# Patient Record
Sex: MALE | Race: WHITE | Employment: OTHER | URBAN - METROPOLITAN AREA
[De-identification: names, ages, dates, MRNs, and addresses within clinical notes are randomized per-mention and may not be internally consistent; named-entity substitution may affect disease eponyms.]

---

## 2022-05-27 ENCOUNTER — OFFICE VISIT (OUTPATIENT)
Dept: ORTHOPEDIC SURGERY | Age: 67
End: 2022-05-27
Payer: MEDICARE

## 2022-05-27 DIAGNOSIS — M25.511 RIGHT SHOULDER PAIN, UNSPECIFIED CHRONICITY: Primary | ICD-10-CM

## 2022-05-27 DIAGNOSIS — M75.101 RIGHT ROTATOR CUFF TEAR ARTHROPATHY: ICD-10-CM

## 2022-05-27 DIAGNOSIS — M12.811 RIGHT ROTATOR CUFF TEAR ARTHROPATHY: ICD-10-CM

## 2022-05-27 PROCEDURE — 1123F ACP DISCUSS/DSCN MKR DOCD: CPT | Performed by: ORTHOPAEDIC SURGERY

## 2022-05-27 PROCEDURE — 99204 OFFICE O/P NEW MOD 45 MIN: CPT | Performed by: ORTHOPAEDIC SURGERY

## 2022-05-27 RX ORDER — DORZOLAMIDE HYDROCHLORIDE AND TIMOLOL MALEATE 20; 5 MG/ML; MG/ML
SOLUTION/ DROPS OPHTHALMIC
COMMUNITY
Start: 2022-05-04

## 2022-05-27 RX ORDER — LATANOPROST 50 UG/ML
SOLUTION/ DROPS OPHTHALMIC
COMMUNITY
Start: 2022-05-04

## 2022-05-27 RX ORDER — MELOXICAM 7.5 MG/1
7.5 TABLET ORAL DAILY
Qty: 14 TABLET | Refills: 1 | Status: SHIPPED | OUTPATIENT
Start: 2022-05-27 | End: 2022-06-10

## 2022-05-27 RX ORDER — ATORVASTATIN CALCIUM 40 MG/1
TABLET, FILM COATED ORAL
COMMUNITY
Start: 2022-03-04

## 2022-05-27 NOTE — PATIENT INSTRUCTIONS
Shoulder Joint Replacement  Although shoulder joint replacement is less common than knee or hip replacement, it is just as successful in relieving joint pain. Shoulder replacement surgery was first performed in the [facility] in the 1950s to treat severe shoulder fractures. Over the years, shoulder joint replacement has come to be used for many other painful conditions of the shoulder, such as different forms of arthritis. Today, about 53,000 people in the [place] have shoulder replacement surgery each year, according to the [employer]. This compares to more than 900,000 Americans a year who have knee and hip replacement surgery. If nonsurgical treatments like medications and activity changes are no longer helpful for relieving pain, you may want to consider shoulder joint replacement surgery. Joint replacement surgery is a safe and effective procedure to relieve pain and help you resume everyday activities. Whether you have just begun exploring treatment options or have already decided to have shoulder joint replacement surgery, this article will help you understand more about this valuable procedure. Anatomy  Your shoulder is made up of three bones: your upper arm bone (humerus), your shoulder blade (scapula), and your collarbone (clavicle). The shoulder is a ball-and-socket joint: The ball, or head, of your upper arm bone fits into a shallow socket in your shoulder blade. This socket is called the glenoid. The surfaces of the bones where they touch are covered with articular cartilage, a smooth substance that protects the bones and enables them to move easily. A thin, smooth tissue called synovial membrane covers all remaining surfaces inside the shoulder joint. In a healthy shoulder, this membrane makes a small amount of fluid that lubricates the cartilage and eliminates almost any friction in your shoulder.     The muscles and tendons that surround the shoulder provide stability and support. All of these structures allow the shoulder to rotate through a greater range of motion than any other joint in the body. Description  In shoulder replacement surgery, the damaged parts of the shoulder are removed and replaced with artificial components, called a prosthesis. The treatment options are either replacement of just the head of the humerus bone (ball), or replacement of both the ball and the socket (glenoid). Cause  Several conditions can cause shoulder pain and disability, and lead patients to consider shoulder joint replacement surgery. Osteoarthritis (Degenerative Joint Disease)  Osteoarthritis is an age-related wear-and-tear type of arthritis. It usually occurs in people 48years of age and older, but may occur in younger people, too. The cartilage that cushions the bones of the shoulder softens and wears away. The bones then rub against one another. Over time, the shoulder joint slowly becomes stiff and painful. Unfortunately, there is no way to prevent the development of osteoarthritis. It is a common reason people have shoulder replacement surgery. Rheumatoid Arthritis  This is a disease in which the synovial membrane that surrounds the joint becomes inflamed and thickened. This chronic inflammation can damage the cartilage and eventually cause cartilage loss, pain, and stiffness. Rheumatoid arthritis is the most common form of a group of disorders termed inflammatory arthritis. Post-traumatic Arthritis  This can follow a serious shoulder injury. Fractures of the bones that make up the shoulder or tears of the shoulder tendons or ligaments may damage the articular cartilage over time. This causes shoulder pain and limits shoulder function. Rotator Cuff Tear Arthropathy  A patient with a very large, long-standing rotator cuff tear may develop cuff tear arthropathy.  In this condition, the changes in the shoulder joint due to the rotator cuff tear may lead to arthritis and destruction of the joint cartilage. Avascular Necrosis (Osteonecrosis)  Avascular necrosis, or osteonecrosis, is a painful condition that occurs when the blood supply to the bone is disrupted. Because bone cells die without a blood supply, osteonecrosis can ultimately cause destruction of the shoulder joint and lead to arthritis. Chronic steroid use, deep sea diving, severe fracture of the shoulder, sickle cell disease, and heavy alcohol use are all risk factors for avascular necrosis. Severe Fractures  A severe fracture of the shoulder is another common reason people have shoulder replacements. When the head of the upper arm bone is shattered, it may be very difficult for a doctor to put the pieces of bone back in place. In addition, the blood supply to the bone pieces can be interrupted. In this case, a surgeon may recommend a shoulder replacement. Older patients with osteoporosis are most at risk for severe shoulder fractures. Failed Previous Shoulder Replacement Surgery  Although uncommon, some shoulder replacements fail, most often because of implant loosening, wear, infection, and dislocation. When this occurs, a second joint replacement surgery -- called a revision surgery -- may be necessary. Is Shoulder Joint Replacement for You? The decision to have shoulder replacement surgery should be a cooperative one between you, your family, your family physician, and your orthopaedic surgeon. There are several reasons why your doctor may recommend shoulder replacement surgery. People who benefit from surgery often have:    Severe shoulder pain that interferes with everyday activities, such as reaching into a cabinet, dressing, toileting, and washing. Moderate to severe pain while resting. This pain may be severe enough to prevent a good night's sleep. Loss of motion and/or weakness in the shoulder.   Failure to substantially improve with other treatments such as anti-inflammatory medications, cortisone injections, and/or physical therapy. Orthopaedic Evaluation  Your family physician may refer you to an orthopaedic surgeon for a thorough evaluation to determine if you can benefit from this surgery. An evaluation with an orthopaedic surgeon consists of several components:     A medical history. Your orthopaedic surgeon will gather information about your general health and ask you about the extent of your shoulder pain and your ability to function.  A physical examination. This will assess shoulder motion, stability, and strength.  X-rays. X-rays help to determine the extent of damage in your shoulder. They can show loss of the normal joint space between bones, flattening or irregularity in the shape of the bone, bone spurs, and loose pieces of cartilage or bone that may be floating inside the joint.  Other tests. Occasionally, your doctor may order blood tests, a computed tomography (CT) scan, a magnetic resonance imaging (MRI) scan, or a bone scan to determine the condition of the bone and soft tissues of your shoulder. Your orthopaedic surgeon will review the results of your evaluation with you and discuss whether shoulder joint replacement is the best method to relieve your pain and improve your function. Other treatment options -- including medications, injections, physical therapy, or other types of surgery -- will also be discussed and considered. Shoulder Replacement Options  Shoulder replacement surgery is highly technical. It should be performed by a surgical team with experience in this procedure. There are different types of shoulder replacements. Your surgeon will evaluate your situation carefully before making any decisions. They will discuss with you which type of replacement will best meet your health needs.  Do not hesitate to ask which type of implant will be used in your situation, and why that choice is right for you.    Total Shoulder Replacement  The standard total shoulder replacement involves replacing the arthritic joint surfaces with a highly polished metal ball attached to a stem, and a plastic socket. These components come in various sizes. They may be either cemented or press fit into the bone. If the bone is of good quality, your surgeon may choose to use a non-cemented (press-fit) humeral component. If the bone is soft, the humeral component may be implanted with bone cement. In most cases, an all-plastic glenoid (socket) component is implanted with bone cement. Implantation of a glenoid component is not advised if:    The glenoid has good cartilage  The glenoid bone is severely deficient  The rotator cuff tendons are irreparably torn  Patients with bone-on-bone osteoarthritis and intact rotator cuff tendons are generally good candidates for conventional total shoulder replacement. Stemmed Hemiarthroplasty  Depending on the condition of your shoulder, your surgeon may replace only the ball. This procedure is called a hemiarthroplasty. In a traditional hemiarthroplasty, the surgeon replaces the head of the humerus with a metal ball and stem, similar to the component used in a total shoulder replacement. This is called a stemmed hemiarthroplasty. Some surgeons recommend hemiarthroplasty when the humeral head is severely fractured but the socket is normal. Other indications for a hemiarthroplasty include:    Arthritis that involves only the head of the humerus, with a glenoid that has a healthy and intact cartilage surface  Shoulders with severely weakened bone in the glenoid  Some shoulders with severely torn rotator cuff tendons and arthritis  Sometimes, surgeons make the decision between a total shoulder replacement and a hemiarthroplasty in the operating room at the time of the surgery.     Studies show that patients with osteoarthritis get better pain relief from total shoulder arthroplasty than from hemiarthroplasty. Resurfacing Hemiarthroplasty  Resurfacing hemiarthroplasty involves replacing just the joint surface of the humeral head with a cap-like prosthesis without a stem. With its bone-preserving advantage, it offers those with arthritis of the shoulder an alternative to the standard stemmed shoulder replacement. Resurfacing hemiarthroplasty may be an option for you if:    The glenoid still has an intact cartilage surface  There has been no fresh fracture of the humeral neck or head  There is a desire to preserve humeral bone  For patients who are young or very active, resurfacing hemiarthroplasty avoids the risks of component wear and loosening that may occur with conventional total shoulder replacements in this patient population. Due to its more conservative nature, resurfacing hemiarthroplasty may be easier to convert to total shoulder replacement, if necessary, at a later time. Reverse Total Shoulder Replacement  Another type of shoulder replacement is called reverse total shoulder replacement. Reverse total shoulder replacement is used for people who have:    Completely torn rotator cuffs with severe arm weakness  The effects of severe arthritis and rotator cuff tearing (cuff tear arthropathy)  Had a previous shoulder replacement that failed    For these individuals, a conventional total shoulder replacement can still leave them with pain. They may also be unable to lift their arm up past a 90-degree angle. Not being able to lift your arm away from the side can be severely debilitating. In reverse total shoulder replacement, the socket and metal ball are switched: A metal ball is attached to the shoulder bone, and a plastic socket is attached to the upper arm bone. This allows the patient to use the deltoid muscle instead of the torn rotator cuff to lift the arm.       Complications  Your orthopaedic surgeon will explain the potential risks and complications of shoulder joint replacement, including those related to the surgery itself and those that can occur over time after your surgery. When complications occur, most are successfully treatable. Possible complications include the following.  Infection. Infection is a complication of any surgery. In shoulder joint replacement, infection may occur in the wound or deep around the prosthesis. It may happen while in the hospital or after you go home. It may even occur years later. Minor infections in the wound area are generally treated with antibiotics. Major or deep infections may require more surgery and removal of the prosthesis. Any infection in your body can spread to your joint replacement.  Prosthesis Problems. Although prosthesis designs and materials, as well as surgical techniques, continue to advance, the prosthesis may wear down and the components may loosen. The components of a shoulder replacement may also dislocate. Excessive wear, loosening, or dislocation may require additional surgery (revision procedure).  Nerve damage. Nerves in the vicinity of the joint replacement may be damaged during surgery, although this type of injury is infrequent. Over time, these nerve injuries often improve and may completely recover. Preparing for Surgery  Medical Evaluation  If you decide to have shoulder replacement surgery, your orthopaedic surgeon may ask you to schedule a complete physical examination with your family physician several weeks before surgery. This is needed to make sure you are healthy enough to have the surgery and complete the recovery process. Many patients with chronic medical conditions, like heart disease, must also be evaluated by a specialist, such as a cardiologist, before the surgery. Medications  Be sure to talk to your orthopaedic surgeon about the medications you take. Some medications may need to be stopped before surgery.  For example, the following over-the-counter medicines may cause excessive bleeding and should be stopped 2 weeks before surgery:     Non-steroidal anti-inflammatory drugs (NSAIDs), such as aspirin, ibuprofen, and naproxen   Most arthritis medications    If you take blood thinners, either your primary care doctor or cardiologist will advise you about stopping these medications before surgery. Home Planning  Making simple changes in your home before surgery can make your recovery period easier. For the first several weeks after your surgery, it will be hard to reach high shelves and cupboards. Before your surgery, be sure to go through your home and place any items you may need afterwards on low shelves. When you come home from the hospital, you will need help for a few weeks with some daily tasks like dressing, bathing, cooking, and laundry. If you will not have any support at home immediately after surgery, you may need a short stay in a rehabilitation facility until you become more independent. Your Surgery  Before Your Operation  Wear loose-fitting clothes and a button-front shirt when you go to the hospital for your surgery. After surgery, you will be wearing a sling and will have limited use of your arm. You will most likely be admitted to the hospital on the day of your surgery. After admission, you will be taken to the preoperative preparation area and will meet a doctor from the anesthesia department. You, your anesthesiologist, and your surgeon will discuss the type of anesthesia to be used. You may be provided a general anesthetic (you are asleep for the entire operation), a regional anesthetic (you may be awake but have no feeling around the surgical area), or a combination of both types. Surgical Procedure  The procedure to replace your shoulder joint with an artificial device usually takes about 2 hours.     After surgery, you will be moved to the recovery room, where you will remain for several hours while your recovery from anesthesia is monitored. After you wake up, you will be taken to your hospital room. Recovery  Your medical team will give you several doses of antibiotics to prevent infection. Most patients are able to eat solid food and get out of bed the day after surgery. You will most likely be able to go home on the first, second, or third day after surgery. Pain Management  After surgery, you will feel some pain. This is a natural part of the healing process. Your doctor and nurses will work to reduce your pain, which can help you recover from surgery faster. Medications are often prescribed for short-term pain relief after surgery. Many types of medicines are available to help manage pain, including opioids, non-steroidal anti-inflammatory drugs (NSAIDs), and local anesthetics. Your doctor may use a combination of these medications to improve pain relief, as well as minimize the need for opioids. Be aware that although opioids help relieve pain after surgery, they are a narcotic and can be addictive. Opioid dependency and overdose has become a critical public health issue in the [place] It is important to use opioids only as directed by your doctor and to stop taking them as soon as your pain begins to improve. Talk to your doctor if your pain has not begun to improve within a few days of your surgery. Pain management is an important part of your recovery. You will begin physical therapy soon after surgery, and when you feel less pain, you can start moving sooner and get your strength back more quickly. Talk with your doctor if postoperative pain becomes a problem. Rehabilitation  A careful, well-planned rehabilitation program is critical to the success of a shoulder replacement. You usually start gentle physical therapy soon after the operation. Your surgeon or physical therapist will provide you with a home exercise program to strengthen your shoulder and improve flexibility.     Your Recovery At Home  When you leave the hospital, your arm will be in a sling. You will need the sling to support and protect your shoulder for the first 2 to 6 weeks after surgery, depending on the complexity of your surgery and your surgeon's preference. Wound care. You will have staples running along your wound or a suture beneath your skin. The staples will be removed several weeks after surgery. A dissolving suture beneath your skin will not require removal.    Avoid soaking the wound in water until it has thoroughly sealed and dried. You may continue to bandage the wound to prevent irritation from clothing. Activity. Exercise is a critical component of home care, particularly during the first few weeks after surgery. Follow your surgeon's home exercise plan to help you regain strength. Most patients are able to perform simple activities such as eating, dressing, and grooming, within 2 weeks after surgery. Some pain with activity and at night is common for several weeks after surgery. You are not allowed to drive a car for 2 to 6 weeks after surgery. Do's and Don'ts  The success of your surgery will depend largely on how well you follow your orthopaedic surgeon's instructions at home during the first few weeks after surgery. Here are some common do's and don'ts for when you return home:     Don't use the arm to push yourself up in bed or from a chair because this requires forceful contraction of muscles.  Do follow the program of home exercises prescribed for you. You may need to do the exercises 2 to 3 times a day for a month or more.  Don't overdo it! If your shoulder pain was severe before the surgery, the experience of pain-free motion may lull you into thinking that you can do more than is prescribed. Early overuse of the shoulder may result in severe limitations in motion.  Don't lift anything heavier than a glass of water for the first 2 to 4 weeks after surgery.  Do ask for assistance.  Your physician may be able to recommend an agency or facility to help if you do not have home support.  Don't participate in contact sports or do any repetitive heavy lifting after your shoulder replacement.  Do avoid placing your arm in any extreme position, such as straight out to the side or behind your body for the first 6 weeks after surgery.    Many thousands of patients have experienced an improved quality of life after shoulder joint replacement surgery. They experience less pain, improved motion and strength, and better function. Research  There are ongoing efforts to design and develop newer and better shoulder replacements that can be performed with less invasive surgical techniques. And researchers are collecting data to determine which patients are the best candidates for which type of shoulder replacement surgery. This information will allow your surgeon to offer you the best recommendation for the treatment of your arthritic shoulder. To help doctors in the management of glenohumeral joint arthritis -- the [employer] Academy [employer] has conducted research to provide some useful guidelines. These are recommendations only and may not apply to every case. For more information: Glenohumeral Joint Osteoarthritis - Clinical Practice Guideline (CPG)  [employer] (aaos. org)  Shoulder Arthritis: Exercises  Introduction  Here are some examples of exercises for you to try. The exercises may be suggested for a condition or for rehabilitation. Start each exercise slowly. Ease off the exercises if you start to have pain. You will be told when to start these exercises and which ones will work best for you. How to do the exercises  Shoulder flexion (lying down)    To make a wand for this exercise, use a piece of PVC pipe or a broom handle with the broom removed. Make the wand about a foot wider than your shoulders. 1. Lie on your back, holding a wand with both hands.  Your palms should face down as you hold the wand. 2. Keeping your elbows straight, slowly raise your arms over your head. Raise them until you feel a stretch in your shoulders, upper back, and chest.  3. Hold for 15 to 30 seconds. 4. Repeat 2 to 4 times. Shoulder rotation (lying down)    To make a wand for this exercise, use a piece of PVC pipe or a broom handle with the broom removed. Make the wand about a foot wider than your shoulders. 1. Lie on your back. Hold a wand with both hands with your elbows bent and palms up. 2. Keep your elbows close to your body, and move the wand across your body toward the sore arm. 3. Hold for 8 to 12 seconds. 4. Repeat 2 to 4 times. Shoulder internal rotation with towel    1. Hold a towel above and behind your head with the arm that is not sore. 2. With your sore arm, reach behind your back and grasp the towel. 3. With the arm above your head, pull the towel upward. Do this until you feel a stretch on the front and outside of your sore shoulder. 4. Hold 15 to 30 seconds. 5. Repeat 2 to 4 times. Shoulder blade squeeze    1. Stand with your arms at your sides, and squeeze your shoulder blades together. Do not raise your shoulders up as you squeeze. 2. Hold 6 seconds. 3. Repeat 8 to 12 times. Resisted rows    For this exercise, you will need elastic exercise material, such as surgical tubing or Thera-Band. 1. Put the band around a solid object at about waist level. (A bedpost will work well.) Each hand should hold an end of the band. 2. With your elbows at your sides and bent to 90 degrees, pull the band back. Your shoulder blades should move toward each other. Return to the starting position. 3. Repeat 8 to 12 times. External rotator strengthening exercise    1. Start by tying a piece of elastic exercise material to a doorknob. You can use surgical tubing or Thera-Band. (You may also hold one end of the band in each hand.)  2.  Stand or sit with your shoulder relaxed and your elbow bent 90 degrees. Your upper arm should rest comfortably against your side. Squeeze a rolled towel between your elbow and your body for comfort. This will help keep your arm at your side. 3. Hold one end of the elastic band with the hand of the painful arm. 4. Start with your forearm across your belly. Slowly rotate the forearm out away from your body. Keep your elbow and upper arm tucked against the towel roll or the side of your body until you begin to feel tightness in your shoulder. Slowly move your arm back to where you started. 5. Repeat 8 to 12 times. Internal rotator strengthening exercise    1. Start by tying a piece of elastic exercise material to a doorknob. You can use surgical tubing or Thera-Band. 2. Stand or sit with your shoulder relaxed and your elbow bent 90 degrees. Your upper arm should rest comfortably against your side. Squeeze a rolled towel between your elbow and your body for comfort. This will help keep your arm at your side. 3. Hold one end of the elastic band in the hand of the painful arm. 4. Slowly rotate your forearm toward your body until it touches your belly. Slowly move it back to where you started. 5. Keep your elbow and upper arm firmly tucked against the towel roll or at your side. 6. Repeat 8 to 12 times. Pendulum swing    If you have pain in your back, do not do this exercise. 1. Hold on to a table or the back of a chair with your good arm. Then bend forward a little and let your sore arm hang straight down. This exercise does not use the arm muscles. Rather, use your legs and your hips to create movement that makes your arm swing freely. 2. Use the movement from your hips and legs to guide the slightly swinging arm back and forth like a pendulum (or elephant trunk). Then guide it in circles that start small (about the size of a dinner plate). Make the circles a bit larger each day, as your pain allows.   3. Do this exercise for 5 minutes, 5 to 7 times each day.  4. As you have less pain, try bending over a little farther to do this exercise. This will increase the amount of movement at your shoulder. Follow-up care is a key part of your treatment and safety. Be sure to make and go to all appointments, and call your doctor if you are having problems. It's also a good idea to know your test results and keep a list of the medicines you take. Where can you learn more? Go to http://www.gray.com/  Enter H562 in the search box to learn more about \"Shoulder Arthritis: Exercises. \"  Current as of: July 1, 2021               Content Version: 13.2  © 9106-4558 [employer], [employer]. Care instructions adapted under license by [employer] (which disclaims liability or warranty for this information). If you have questions about a medical condition or this instruction, always ask your healthcare professional. [place] [address] any warranty or liability for your use of this information.

## 2022-05-27 NOTE — PROGRESS NOTES
Name: Wero Gutierrez  YOB: 1955  Gender: male  MRN: [id number]    CC:   Chief Complaint   Patient presents with    Shoulder Pain     right shoulder pain    Right shoulder(s) pain, stiffness    HPI:  This patient presents with a 5-month history of Right shoulder pain and limited motion. Patient states that shoulder pain began after a fall in January. Patient states after his fall he was able to lift his arm but it felt as though he had a deep bruise. He states it did improve over the following month, but plateaued afterwards. He notes a consistent deep and lateral soreness/ache. He notes that it has been harder to support weight and not on when reaching. Denies numbness, tingling, prior issues or surgical intervention before injury. Did initially take anti-inflammatories but is not currently taking anything at this time. Not on File  History reviewed. No pertinent past medical history. History reviewed. No pertinent surgical history. History reviewed. No pertinent family history. Social History     Socioeconomic History    Marital status:      Spouse name: Not on file    Number of children: Not on file    Years of education: Not on file    Highest education level: Not on file   Occupational History    Not on file   Tobacco Use    Smoking status: Never Smoker    Smokeless tobacco: Never Used   Substance and Sexual Activity    Alcohol use: Not on file    Drug use: Not on file    Sexual activity: Not on file   Other Topics Concern    Not on file   Social History Narrative    Not on file     Social Determinants of Health     Financial Resource Strain:     Difficulty of Paying Living Expenses: Not on file   Food Insecurity:     Worried About Running Out of Food in the Last Year: Not on file    Lucio of Food in the Last Year: Not on file   Transportation Needs:     Lack of Transportation (Medical): Not on file    Lack of Transportation (Non-Medical):  Not on file Physical Activity:     Days of Exercise per Week: Not on file    Minutes of Exercise per Session: Not on file   Stress:     Feeling of Stress : Not on file   Social Connections:     Frequency of Communication with Friends and Family: Not on file    Frequency of Social Gatherings with Friends and Family: Not on file    Attends [employer]: Not on file    Active Member of [address] [place] [address] [employer] or Organizations: Not on file    Attends Club or Organization Meetings: Not on file    Marital Status: Not on file   Intimate Partner Violence:     Fear of Current or Ex-Partner: Not on file    Emotionally Abused: Not on file    Physically Abused: Not on file    Sexually Abused: Not on file   Housing Stability:     Unable to Pay for Housing in the Last Year: Not on file    Number of [place] in the Last Year: Not on file    Unstable Housing in the Last Year: Not on file        No flowsheet data found. Review of Systems  Non-contributory    Also noted on the patient medical history form on the chart and are reviewed today. Pertinent positives and negatives are addressed with the patient, particularly those related to musculoskeletal concerns. Non-orthopaedic concerns were referred back to the primary care physician. PE:    GEN: General appearance is that of a healthy patient, alert and oriented, in no distress. WDWN. HEENT:  Normocephalic, atraumatic. Extraocular muscles are intact. Neck:  Supple, no lymphadenopathy. Heart:  Regular pulse exam on all extremities. Good color and warmth noted. Lungs:  Normal non-labored breathing with no obvious shortness of breath. Abdomen:  WNL's. Skin:  No signs of rash or bruising. Pysch: Answers questions appropriately, AO X 3. MSK:  Cervical spine: No tenderness. Full active motion. normal Spurling's maneuver.      SHOULDER   Right (involved)  left   Skin Intact Intact   Radial Pulses 2+ symmetrical  2+ symmetrical   Myotomes Normal Normal   Dermatomes Normal Normal   ROM  full Full   Strength  weakness noted in external rotation, weakness in the scapular plane No weakness   Atrophy None noted None noted   Effusion/Swelling  None None   Palpation  tender [facility] joint No Tenderness   Bicep Tendon Rupture   negative Negative   Bear Hug, Belly Press  negative Not tested   Crossed Arm Adduction Test Not tested Not tested   Instability/Ant. Apprehension Test None  None   Impingement limited Negative          X-rays:   AP, Grashey, outlet and axillary views of the Right shoulder were obtained today and reviewed in the office. They show severe glenohumeral arthritis with inferior osteophyte seen on the humeral head. Some calcification seen over the distal clavicle superiorly. Loss of acromiohumeral index indicating some proximal humeral migration. Oleg Statoni impression:  Right shoulder possible rotator cuff tear arthropathy    A/Plan:     ICD-10-CM    1. Right shoulder pain, unspecified chronicity  M25.511 XR SHOULDER RIGHT (MIN 2 VIEWS)   2. Right rotator cuff tear arthropathy  M75.101     M12.811        I had a long discussion with the patient regarding the natural history of the problem, as well as treatment options. Treatment:   Patient prescribed with Non-steroidal anti-inflammatories after review of risks and benefits. We discussed additional activity modification to help reduce pain for initial conservative treatment. Recommend therapy to evaluate and treat current complaints and pathology. A home exercise program was also discussed with the patient. A handout from the [employer] on total shoulder replacement was provided. Home exercises also provided and reviewed. No follow-up provider specified. Thank you for the opportunity to see and take care of your patients. If you ever have any questions please give me a call.    Sincerely,  Vinh Dumas MD  05/27/22

## 2022-07-12 ENCOUNTER — OFFICE VISIT (OUTPATIENT)
Dept: ORTHOPEDIC SURGERY | Age: 67
End: 2022-07-12
Payer: MEDICARE

## 2022-07-12 DIAGNOSIS — M75.101 RIGHT ROTATOR CUFF TEAR ARTHROPATHY: ICD-10-CM

## 2022-07-12 DIAGNOSIS — M25.511 RIGHT SHOULDER PAIN, UNSPECIFIED CHRONICITY: Primary | ICD-10-CM

## 2022-07-12 DIAGNOSIS — M12.811 RIGHT ROTATOR CUFF TEAR ARTHROPATHY: ICD-10-CM

## 2022-07-12 PROCEDURE — 99213 OFFICE O/P EST LOW 20 MIN: CPT | Performed by: ORTHOPAEDIC SURGERY

## 2022-07-12 PROCEDURE — 1123F ACP DISCUSS/DSCN MKR DOCD: CPT | Performed by: ORTHOPAEDIC SURGERY

## 2022-07-12 NOTE — PATIENT INSTRUCTIONS
Shoulder Joint Replacement  Although shoulder joint replacement is less common than knee or hip replacement, it is just as successful in relieving joint pain. Shoulder replacement surgery was first performed in the [facility] in the 1950s to treat severe shoulder fractures. Over the years, shoulder joint replacement has come to be used for many other painful conditions of the shoulder, such as different forms of arthritis. Today, about 53,000 people in the [place] have shoulder replacement surgery each year, according to the [facility] [employer]. This compares to more than 900,000 Americans a year who have knee and hip replacement surgery. If nonsurgical treatments like medications and activity changes are no longer helpful for relieving pain, you may want to consider shoulder joint replacement surgery. Joint replacement surgery is a safe and effective procedure to relieve pain and help you resume everyday activities. Whether you have just begun exploring treatment options or have already decided to have shoulder joint replacement surgery, this article will help you understand more about this valuable procedure. Anatomy  Your shoulder is made up of three bones: your upper arm bone (humerus), your shoulder blade (scapula), and your collarbone (clavicle). The shoulder is a ball-and-socket joint: The ball, or head, of your upper arm bone fits into a shallow socket in your shoulder blade. This socket is called the glenoid. The surfaces of the bones where they touch are covered with articular cartilage, a smooth substance that protects the bones and enables them to move easily. A thin, smooth tissue called synovial membrane covers all remaining surfaces inside the shoulder joint. In a healthy shoulder, this membrane makes a small amount of fluid that lubricates the cartilage and eliminates almost any friction in your shoulder.     The muscles and tendons that surround the shoulder provide stability and support. All of these structures allow the shoulder to rotate through a greater range of motion than any other joint in the body. Description  In shoulder replacement surgery, the damaged parts of the shoulder are removed and replaced with artificial components, called a prosthesis. The treatment options are either replacement of just the head of the humerus bone (ball), or replacement of both the ball and the socket (glenoid). Cause  Several conditions can cause shoulder pain and disability, and lead patients to consider shoulder joint replacement surgery. Osteoarthritis (Degenerative Joint Disease)  Osteoarthritis is an age-related wear-and-tear type of arthritis. It usually occurs in people 48years of age and older, but may occur in younger people, too. The cartilage that cushions the bones of the shoulder softens and wears away. The bones then rub against one another. Over time, the shoulder joint slowly becomes stiff and painful. Unfortunately, there is no way to prevent the development of osteoarthritis. It is a common reason people have shoulder replacement surgery. Rheumatoid Arthritis  This is a disease in which the synovial membrane that surrounds the joint becomes inflamed and thickened. This chronic inflammation can damage the cartilage and eventually cause cartilage loss, pain, and stiffness. Rheumatoid arthritis is the most common form of a group of disorders termed inflammatory arthritis. Post-traumatic Arthritis  This can follow a serious shoulder injury. Fractures of the bones that make up the shoulder or tears of the shoulder tendons or ligaments may damage the articular cartilage over time. This causes shoulder pain and limits shoulder function. Rotator Cuff Tear Arthropathy  A patient with a very large, long-standing rotator cuff tear may develop cuff tear arthropathy.  In this condition, the changes in the shoulder joint due to the rotator cuff tear may lead to arthritis and destruction of the joint cartilage. Avascular Necrosis (Osteonecrosis)  Avascular necrosis, or osteonecrosis, is a painful condition that occurs when the blood supply to the bone is disrupted. Because bone cells die without a blood supply, osteonecrosis can ultimately cause destruction of the shoulder joint and lead to arthritis. Chronic steroid use, deep sea diving, severe fracture of the shoulder, sickle cell disease, and heavy alcohol use are all risk factors for avascular necrosis. Severe Fractures  A severe fracture of the shoulder is another common reason people have shoulder replacements. When the head of the upper arm bone is shattered, it may be very difficult for a doctor to put the pieces of bone back in place. In addition, the blood supply to the bone pieces can be interrupted. In this case, a surgeon may recommend a shoulder replacement. Older patients with osteoporosis are most at risk for severe shoulder fractures. Failed Previous Shoulder Replacement Surgery  Although uncommon, some shoulder replacements fail, most often because of implant loosening, wear, infection, and dislocation. When this occurs, a second joint replacement surgery -- called a revision surgery -- may be necessary. Is Shoulder Joint Replacement for You? The decision to have shoulder replacement surgery should be a cooperative one between you, your family, your family physician, and your orthopaedic surgeon. There are several reasons why your doctor may recommend shoulder replacement surgery. People who benefit from surgery often have:    Severe shoulder pain that interferes with everyday activities, such as reaching into a cabinet, dressing, toileting, and washing. Moderate to severe pain while resting. This pain may be severe enough to prevent a good night's sleep. Loss of motion and/or weakness in the shoulder.   Failure to substantially improve with other treatments you.    Total Shoulder Replacement  The standard total shoulder replacement involves replacing the arthritic joint surfaces with a highly polished metal ball attached to a stem, and a plastic socket. These components come in various sizes. They may be either cemented or press fit into the bone. If the bone is of good quality, your surgeon may choose to use a non-cemented (press-fit) humeral component. If the bone is soft, the humeral component may be implanted with bone cement. In most cases, an all-plastic glenoid (socket) component is implanted with bone cement. Implantation of a glenoid component is not advised if:    The glenoid has good cartilage  The glenoid bone is severely deficient  The rotator cuff tendons are irreparably torn  Patients with bone-on-bone osteoarthritis and intact rotator cuff tendons are generally good candidates for conventional total shoulder replacement. Stemmed Hemiarthroplasty  Depending on the condition of your shoulder, your surgeon may replace only the ball. This procedure is called a hemiarthroplasty. In a traditional hemiarthroplasty, the surgeon replaces the head of the humerus with a metal ball and stem, similar to the component used in a total shoulder replacement. This is called a stemmed hemiarthroplasty. Some surgeons recommend hemiarthroplasty when the humeral head is severely fractured but the socket is normal. Other indications for a hemiarthroplasty include:    Arthritis that involves only the head of the humerus, with a glenoid that has a healthy and intact cartilage surface  Shoulders with severely weakened bone in the glenoid  Some shoulders with severely torn rotator cuff tendons and arthritis  Sometimes, surgeons make the decision between a total shoulder replacement and a hemiarthroplasty in the operating room at the time of the surgery.     Studies show that patients with osteoarthritis get better pain relief from total shoulder arthroplasty than from hemiarthroplasty. Resurfacing Hemiarthroplasty  Resurfacing hemiarthroplasty involves replacing just the joint surface of the humeral head with a cap-like prosthesis without a stem. With its bone-preserving advantage, it offers those with arthritis of the shoulder an alternative to the standard stemmed shoulder replacement. Resurfacing hemiarthroplasty may be an option for you if:    The glenoid still has an intact cartilage surface  There has been no fresh fracture of the humeral neck or head  There is a desire to preserve humeral bone  For patients who are young or very active, resurfacing hemiarthroplasty avoids the risks of component wear and loosening that may occur with conventional total shoulder replacements in this patient population. Due to its more conservative nature, resurfacing hemiarthroplasty may be easier to convert to total shoulder replacement, if necessary, at a later time. Reverse Total Shoulder Replacement  Another type of shoulder replacement is called reverse total shoulder replacement. Reverse total shoulder replacement is used for people who have:    Completely torn rotator cuffs with severe arm weakness  The effects of severe arthritis and rotator cuff tearing (cuff tear arthropathy)  Had a previous shoulder replacement that failed    For these individuals, a conventional total shoulder replacement can still leave them with pain. They may also be unable to lift their arm up past a 90-degree angle. Not being able to lift your arm away from the side can be severely debilitating. In reverse total shoulder replacement, the socket and metal ball are switched: A metal ball is attached to the shoulder bone, and a plastic socket is attached to the upper arm bone. This allows the patient to use the deltoid muscle instead of the torn rotator cuff to lift the arm.       Complications  Your orthopaedic surgeon will explain the potential risks and complications of shoulder joint replacement, including those related to the surgery itself and those that can occur over time after your surgery. When complications occur, most are successfully treatable. Possible complications include the following.  Infection. Infection is a complication of any surgery. In shoulder joint replacement, infection may occur in the wound or deep around the prosthesis. It may happen while in the hospital or after you go home. It may even occur years later. Minor infections in the wound area are generally treated with antibiotics. Major or deep infections may require more surgery and removal of the prosthesis. Any infection in your body can spread to your joint replacement.  Prosthesis Problems. Although prosthesis designs and materials, as well as surgical techniques, continue to advance, the prosthesis may wear down and the components may loosen. The components of a shoulder replacement may also dislocate. Excessive wear, loosening, or dislocation may require additional surgery (revision procedure).  Nerve damage. Nerves in the vicinity of the joint replacement may be damaged during surgery, although this type of injury is infrequent. Over time, these nerve injuries often improve and may completely recover. Preparing for Surgery  Medical Evaluation  If you decide to have shoulder replacement surgery, your orthopaedic surgeon may ask you to schedule a complete physical examination with your family physician several weeks before surgery. This is needed to make sure you are healthy enough to have the surgery and complete the recovery process. Many patients with chronic medical conditions, like heart disease, must also be evaluated by a specialist, such as a cardiologist, before the surgery. Medications  Be sure to talk to your orthopaedic surgeon about the medications you take. Some medications may need to be stopped before surgery.  For example, the following over-the-counter medicines may cause excessive bleeding and should be stopped 2 weeks before surgery:     Non-steroidal anti-inflammatory drugs (NSAIDs), such as aspirin, ibuprofen, and naproxen   Most arthritis medications    If you take blood thinners, either your primary care doctor or cardiologist will advise you about stopping these medications before surgery. Home Planning  Making simple changes in your home before surgery can make your recovery period easier. For the first several weeks after your surgery, it will be hard to reach high shelves and cupboards. Before your surgery, be sure to go through your home and place any items you may need afterwards on low shelves. When you come home from the hospital, you will need help for a few weeks with some daily tasks like dressing, bathing, cooking, and laundry. If you will not have any support at home immediately after surgery, you may need a short stay in a rehabilitation facility until you become more independent. Your Surgery  Before Your Operation  Wear loose-fitting clothes and a button-front shirt when you go to the hospital for your surgery. After surgery, you will be wearing a sling and will have limited use of your arm. You will most likely be admitted to the hospital on the day of your surgery. After admission, you will be taken to the preoperative preparation area and will meet a doctor from the anesthesia department. You, your anesthesiologist, and your surgeon will discuss the type of anesthesia to be used. You may be provided a general anesthetic (you are asleep for the entire operation), a regional anesthetic (you may be awake but have no feeling around the surgical area), or a combination of both types. Surgical Procedure  The procedure to replace your shoulder joint with an artificial device usually takes about 2 hours.     After surgery, you will be moved to the recovery room, where you will remain for several hours while your recovery from anesthesia is monitored. After you wake up, you will be taken to your hospital room. Recovery  Your medical team will give you several doses of antibiotics to prevent infection. Most patients are able to eat solid food and get out of bed the day after surgery. You will most likely be able to go home on the first, second, or third day after surgery. Pain Management  After surgery, you will feel some pain. This is a natural part of the healing process. Your doctor and nurses will work to reduce your pain, which can help you recover from surgery faster. Medications are often prescribed for short-term pain relief after surgery. Many types of medicines are available to help manage pain, including opioids, non-steroidal anti-inflammatory drugs (NSAIDs), and local anesthetics. Your doctor may use a combination of these medications to improve pain relief, as well as minimize the need for opioids. Be aware that although opioids help relieve pain after surgery, they are a narcotic and can be addictive. Opioid dependency and overdose has become a critical public health issue in the [place] It is important to use opioids only as directed by your doctor and to stop taking them as soon as your pain begins to improve. Talk to your doctor if your pain has not begun to improve within a few days of your surgery. Pain management is an important part of your recovery. You will begin physical therapy soon after surgery, and when you feel less pain, you can start moving sooner and get your strength back more quickly. Talk with your doctor if postoperative pain becomes a problem. Rehabilitation  A careful, well-planned rehabilitation program is critical to the success of a shoulder replacement. You usually start gentle physical therapy soon after the operation. Your surgeon or physical therapist will provide you with a home exercise program to strengthen your shoulder and improve flexibility.     Your Recovery At Home  When you leave the recommend an agency or facility to help if you do not have home support.  Don't participate in contact sports or do any repetitive heavy lifting after your shoulder replacement.  Do avoid placing your arm in any extreme position, such as straight out to the side or behind your body for the first 6 weeks after surgery.    Many thousands of patients have experienced an improved quality of life after shoulder joint replacement surgery. They experience less pain, improved motion and strength, and better function. Research  There are ongoing efforts to design and develop newer and better shoulder replacements that can be performed with less invasive surgical techniques. And researchers are collecting data to determine which patients are the best candidates for which type of shoulder replacement surgery. This information will allow your surgeon to offer you the best recommendation for the treatment of your arthritic shoulder. To help doctors in the management of glenohumeral joint arthritis -- the [employer] has conducted research to provide some useful guidelines. These are recommendations only and may not apply to every case. For more information: Glenohumeral Joint Osteoarthritis - Clinical Practice Guideline (CPG)  [employer] (aaos. org)  Shoulder Arthritis: Exercises  Introduction  Here are some examples of exercises for you to try. The exercises may be suggested for a condition or for rehabilitation. Start each exercise slowly. Ease off the exercises if you start to have pain. You will be told when to start these exercises and which ones will work best for you. How to do the exercises  Shoulder flexion (lying down)    To make a wand for this exercise, use a piece of PVC pipe or a broom handle with the broom removed. Make the wand about a foot wider than your shoulders. 1. Lie on your back, holding a wand with both hands.  Your palms should face down as you hold the wand. 2. Keeping your elbows straight, slowly raise your arms over your head. Raise them until you feel a stretch in your shoulders, upper back, and chest.  3. Hold for 15 to 30 seconds. 4. Repeat 2 to 4 times. Shoulder rotation (lying down)    To make a wand for this exercise, use a piece of PVC pipe or a broom handle with the broom removed. Make the wand about a foot wider than your shoulders. 1. Lie on your back. Hold a wand with both hands with your elbows bent and palms up. 2. Keep your elbows close to your body, and move the wand across your body toward the sore arm. 3. Hold for 8 to 12 seconds. 4. Repeat 2 to 4 times. Shoulder internal rotation with towel    1. Hold a towel above and behind your head with the arm that is not sore. 2. With your sore arm, reach behind your back and grasp the towel. 3. With the arm above your head, pull the towel upward. Do this until you feel a stretch on the front and outside of your sore shoulder. 4. Hold 15 to 30 seconds. 5. Repeat 2 to 4 times. Shoulder blade squeeze    1. Stand with your arms at your sides, and squeeze your shoulder blades together. Do not raise your shoulders up as you squeeze. 2. Hold 6 seconds. 3. Repeat 8 to 12 times. Resisted rows    For this exercise, you will need elastic exercise material, such as surgical tubing or Thera-Band. 1. Put the band around a solid object at about waist level. (A bedpost will work well.) Each hand should hold an end of the band. 2. With your elbows at your sides and bent to 90 degrees, pull the band back. Your shoulder blades should move toward each other. Return to the starting position. 3. Repeat 8 to 12 times. External rotator strengthening exercise    1. Start by tying a piece of elastic exercise material to a doorknob. You can use surgical tubing or Thera-Band. (You may also hold one end of the band in each hand.)  2.  Stand or sit with your shoulder relaxed and your elbow bent 90 degrees. Your upper arm should rest comfortably against your side. Squeeze a rolled towel between your elbow and your body for comfort. This will help keep your arm at your side. 3. Hold one end of the elastic band with the hand of the painful arm. 4. Start with your forearm across your belly. Slowly rotate the forearm out away from your body. Keep your elbow and upper arm tucked against the towel roll or the side of your body until you begin to feel tightness in your shoulder. Slowly move your arm back to where you started. 5. Repeat 8 to 12 times. Internal rotator strengthening exercise    1. Start by tying a piece of elastic exercise material to a doorknob. You can use surgical tubing or Thera-Band. 2. Stand or sit with your shoulder relaxed and your elbow bent 90 degrees. Your upper arm should rest comfortably against your side. Squeeze a rolled towel between your elbow and your body for comfort. This will help keep your arm at your side. 3. Hold one end of the elastic band in the hand of the painful arm. 4. Slowly rotate your forearm toward your body until it touches your belly. Slowly move it back to where you started. 5. Keep your elbow and upper arm firmly tucked against the towel roll or at your side. 6. Repeat 8 to 12 times. Pendulum swing    If you have pain in your back, do not do this exercise. 1. Hold on to a table or the back of a chair with your good arm. Then bend forward a little and let your sore arm hang straight down. This exercise does not use the arm muscles. Rather, use your legs and your hips to create movement that makes your arm swing freely. 2. Use the movement from your hips and legs to guide the slightly swinging arm back and forth like a pendulum (or elephant trunk). Then guide it in circles that start small (about the size of a dinner plate). Make the circles a bit larger each day, as your pain allows.   3. Do this exercise for 5 minutes, 5 to 7 times each

## 2022-07-12 NOTE — PROGRESS NOTES
Name: Hermelinda Barbosa  YOB: 1955  Gender: male  MRN: [id number]    CC:   Chief Complaint   Patient presents with    Follow-up     right shoulder        HPI: Patient presents for follow-up of right shoulder pain and limited motion after fall that began in January. Patient was seen last on 5- and given an NSAID as well as discuss physical therapy. Today the patient states continued pain when reaching above head. Patient denies numbness, tingling, popping and clicking. He denies interference with sleep. He would like to get back to his normal activities including playing golf, doing yard work. He has been doing physical therapy. Not on File  History reviewed. No pertinent past medical history. History reviewed. No pertinent surgical history. History reviewed. No pertinent family history. Social History     Socioeconomic History    Marital status:      Spouse name: Not on file    Number of children: Not on file    Years of education: Not on file    Highest education level: Not on file   Occupational History    Not on file   Tobacco Use    Smoking status: Never Smoker    Smokeless tobacco: Never Used   Substance and Sexual Activity    Alcohol use: Not on file    Drug use: Not on file    Sexual activity: Not on file   Other Topics Concern    Not on file   Social History Narrative    Not on file     Social Determinants of Health     Financial Resource Strain:     Difficulty of Paying Living Expenses: Not on file   Food Insecurity:     Worried About Running Out of Food in the Last Year: Not on file    Lucio of Food in the Last Year: Not on file   Transportation Needs:     Lack of Transportation (Medical): Not on file    Lack of Transportation (Non-Medical):  Not on file   Physical Activity:     Days of Exercise per Week: Not on file    Minutes of Exercise per Session: Not on file   Stress:     Feeling of Stress : Not on file   Social Connections:     Frequency of Communication with Friends and Family: Not on file    Frequency of Social Gatherings with Friends and Family: Not on file    Attends Mandaeism Services: Not on file    Active Member of Clubs or Organizations: Not on file    Attends Club or Organization Meetings: Not on file    Marital Status: Not on file   Intimate Partner Violence:     Fear of Current or Ex-Partner: Not on file    Emotionally Abused: Not on file    Physically Abused: Not on file    Sexually Abused: Not on file   Housing Stability:     Unable to Pay for Housing in the Last Year: Not on file    Number of [place] in the Last Year: Not on file    Unstable Housing in the Last Year: Not on file        No flowsheet data found. Review of Systems  Non-contributory    PE right shoulder:      SHOULDER   Right (involved)  left   Skin Intact Intact   Radial Pulses 2+ symmetrical  2+ symmetrical   Myotomes Normal Normal   Dermatomes  Normal Normal   ROM  full Full   Strength  weakness noted in external rotation, weakness in the scapular plane No weakness   Atrophy None noted None noted   Effusion/Swelling  None None   [facility]pation  [facility] joint No Tenderness   Bicep Tendon Rupture   negative Negative   Bear Hug, Belly Press  negative Not tested   Crossed Arm Adduction Test Not tested Not tested   Instability/Ant. Apprehension Test None  None   Impingement limited Negative                X-rays were obtained on 5/27/2022 of the right shoulder including an AP outlet and axillary view. Patient has calcification seen superior to the distal clavicle that appears chronic in nature. He has loss of acromiohumeral index. Also has a fairly moderate sized osteophyte on the inferior humerus. There may be some loss of the arch inferiorly. Thinning of the joint space noted. Some cystic change seen superiorly    Right shoulder arthritis    A/Plan:     ICD-10-CM    1. Right shoulder pain, unspecified chronicity  M25.511    2.  Right rotator cuff tear arthropathy  M75.101     M12.811         Progress with therapy towards home program.  Discussed over-the-counter NSAID use for days when his playing golf and he has more of this achy or soreness. Return if symptoms worsen or fail to improve.         Tenisha Diallo MD  07/12/22